# Patient Record
(demographics unavailable — no encounter records)

---

## 2019-01-14 PROCEDURE — 88175 CYTOPATH C/V AUTO FLUID REDO: CPT | Performed by: OBSTETRICS & GYNECOLOGY

## 2024-05-17 NOTE — DISCHARGE INSTRUCTIONS
Suction Curettage (Therapeutic Dilation & Curettage, D&C)   Suction curettage is a procedure to remove the lining and contents of the womb (uterus). It may be done to stop bleeding, control pain, and prevent infection after a miscarriage, , or childbirth. It may also be done to remove a molar pregnancy. This is when tumors grow in the womb instead of or in addition to a fetus.   After the procedure, you should be able to return to your normal routine in 1 or 2 days. But you may have some cramping and light bleeding. This is normal. These problems should go away within 5 to 7 days. You can expect to have your next period within 4 to 6 weeks.   Home care  If you have pain or cramping, use pain medicine as directed.  If you have light bleeding, use pads instead of tampons. Change these as often as needed.  Don't douche, use tampons, or have sex until your healthcare provider says it’s OK.  Take showers instead of baths for 1 to 2 weeks.    Follow-up care  Follow-up with your healthcare provider as directed.  When to seek medical advice  Call your healthcare provider right away if any of these occur:  Fever of 100.4ºF (38ºC) or higher, or as directed by your healthcare provider  Heavy bleeding  Bleeding that lasts longer than 1 week  Pain or cramping worsens instead of getting better  Foul-smelling discharge from the vagina  Passage of anything that resembles tissue from the vagina. If possible, save the tissue and bring it to the healthcare provider.  Weakness, dizziness, or fainting  [employer] last reviewed this educational content on 2022 [employer]. All rights reserved. This information is not intended as a substitute for professional medical care. Always follow your healthcare professional's instructions.

## 2024-05-17 NOTE — ANESTHESIA PREPROCEDURE EVALUATION
PRE-OP EVALUATION    Patient Name: Rhianna Bardales    Admit Diagnosis: MISSED AB    Pre-op Diagnosis: MISSED AB    DILATION AND CURETTAGE SUCTION    Anesthesia Procedure: DILATION AND CURETTAGE SUCTION (Vagina )    Surgeons and Role:     * Chica Kumar MD - Primary    Pre-op vitals reviewed.  Temp: 97.9 °F (36.6 °C)  Pulse: 59  Resp: 16  BP: 121/75  SpO2: 98 %  Body mass index is 23.72 kg/m².    Current medications reviewed.  Hospital Medications:   [Transfer Hold] acetaminophen (Tylenol Extra Strength) tab 1,000 mg  1,000 mg Oral Once    [Transfer Hold] scopolamine (Transderm-Scop) 1 MG/3DAYS patch 1 patch  1 patch Transdermal Once    lactated ringers infusion   Intravenous Continuous    doxycycline hyclate (Vibramycin) 200 mg in sodium chloride 0.9% 250 mL IVPB  200 mg Intravenous Once    [Transfer Hold] Rho D immune globulin (Rhophylac) 1500 Units/2mL injection 300 mcg  300 mcg Intramuscular Once       Outpatient Medications:     Medications Prior to Admission   Medication Sig Dispense Refill Last Dose    Multiple Vitamins-Minerals (MULTIVITAMIN ADULT OR) Take 1 tablet by mouth daily.   5/16/2024       Allergies: Penicillins      Anesthesia Evaluation    Patient summary reviewed.    Anesthetic Complications  (-) history of anesthetic complications         GI/Hepatic/Renal    Negative GI/hepatic/renal ROS.                             Cardiovascular        Exercise tolerance: good                                                Endo/Other    Negative endo/other ROS.                              Pulmonary    Negative pulmonary ROS.                       Neuro/Psych    Negative neuro/psych ROS.                                  Past Surgical History:   Procedure Laterality Date    Patient denies any surgical history       Social History     Socioeconomic History    Marital status: Single   Tobacco Use    Smoking status: Never    Smokeless tobacco: Never   Vaping Use    Vaping status: Never Used   Substance and  Sexual Activity    Alcohol use: Not Currently     Comment: RARE    Drug use: No    Sexual activity: Yes     Partners: Male     Birth control/protection: Implant     History   Drug Use No     Available pre-op labs reviewed.  Lab Results   Component Value Date    WBC 10.7 05/17/2024    RBC 4.53 05/17/2024    HGB 14.3 05/17/2024    HCT 41.9 05/17/2024    MCV 92.5 05/17/2024    MCH 31.6 05/17/2024    MCHC 34.1 05/17/2024    RDW 11.4 05/17/2024    .0 05/17/2024               Airway      Mallampati: II       Cardiovascular    Cardiovascular exam normal.         Dental    Dentition appears grossly intact         Pulmonary    Pulmonary exam normal.                 Other findings              ASA: 2   Plan: MAC  NPO status verified and patient meets guidelines.    Post-procedure pain management plan discussed with surgeon and patient.      Plan/risks discussed with: patient and spouse                Present on Admission:  **None**

## 2024-05-17 NOTE — BRIEF OP NOTE
Pre-Operative Diagnosis: MISSED AB     Post-Operative Diagnosis: MISSED AB      Procedure Performed:   DILATION AND CURETTAGE SUCTION    Surgeons and Role:     * Chica Kumar MD - Primary    Assistant(s):        Surgical Findings: fetal tissue     Specimen: POC - placental tissue retained sterile in saline for chromosome evaluation     Estimated Blood Loss: 50cc    Dictation Number:  [id number]    Chica Kumar MD  5/17/2024  2:59 PM

## 2024-05-17 NOTE — ANESTHESIA POSTPROCEDURE EVALUATION
[facility]    Rhianna Bardales Patient Status:  Hospital Outpatient Surgery   Age/Gender 30 year old female MRN [id number]   Location [facility] PERIOPERATIVE SERVICE Attending Chica Kumar MD   Hosp Day # 0 PCP Arash Knight MD       Anesthesia Post-op Note    DILATION AND CURETTAGE SUCTION    Procedure Summary       Date: 05/17/24 Room / Location:  MAIN OR 15 / [facility] MAIN OR    Anesthesia Start: 1426 Anesthesia Stop: 1512    Procedure: DILATION AND CURETTAGE SUCTION (Vagina ) Diagnosis: (MISSED AB)    Surgeons: Chica Kumar MD Anesthesiologist: Ian Shepherd MD    Anesthesia Type: MAC ASA Status: 2            Anesthesia Type: MAC    Vitals Value Taken Time   /75 05/17/24 1512   Temp 99 05/17/24 1512   Pulse 75 05/17/24 1512   Resp 12 05/17/24 1512   SpO2 98 05/17/24 1512       Patient Location: Endoscopy    Anesthesia Type: MAC    Airway Patency: patent    Postop Pain Control: adequate    Mental Status: mildly sedated but able to meaningfully participate in the post-anesthesia evaluation    Nausea/Vomiting: none    Cardiopulmonary/Hydration status: stable euvolemic    Complications: no apparent anesthesia related complications    Postop vital signs: stable    Dental Exam: Unchanged from Preop    Patient to be discharged from PACU when criteria met.

## 2024-05-17 NOTE — H&P
Rhianna Bardales is a 30 year old  who presents for pre-op for missed AB - has been previously given the options - has had no bleeding since  when the US was done -showing non-viable IUP at 7w1d - no FHT's desires now to proceed with D&C    Menses: Patient's last menstrual period was 2024 (exact date). Cycle length: monthly days Flow: 5 d    PMH - menarche - age 13  No surgeries  No medical issues  Non-smoker    Current Outpatient Medications   Medication Sig Dispense Refill   Multiple Vitamins-Minerals (MULTIVITAMIN ADULT OR) Take 1 tablet by mouth daily.     Past Medical History:   Diagnosis Date   Allergic rhinitis   Hospitalism   none     Past Surgical History:   Procedure Laterality Date   NEXPLANON INJ 2021   Removal and Reinsertion     Family History   Problem Relation Age of Onset   No Known Problems Father   No Known Problems Mother     Social History:   Social History  Tobacco Use  Smoking status: Never  Smokeless tobacco: Never  Vaping Use  Vaping Use: Never used  Alcohol use: Yes  Comment: very rare  Drug use: No      REVIEW OF SYSTEMS:   CONSTITUTIONAL: generally feels well   SKIN: denies any unusual skin lesions, rash, itchiness  HEENT: denies nasal congestion, sinus pain or sore throat; denies blurred vision, visual changes  CARDIOVASCULAR: denies chest pain   LUNGS: denies shortness of breath   GI: denies abdominal pain,denies heartburn  : denies dysuria, vaginal discharge or itching, periods regular   MUSCULOSKELETAL: denies back pain, muscle or joint aches  NEUROLOGIC: denies headaches  PSYCHIATRIC: denies depression or anxiety, mood is good  HEMATOLOGIC: denies history of anemia  ENDOCRINE: denies thyroid history, cold/heat intolerance  ALLERGIC: denies allergy symptoms    EXAM:     VITALS: /78  Pulse 67  Ht 5' 6.5\" (1.689 m)  Wt 149 lb (67.6 kg)  LMP 2024 (Exact Date)  BMI 23.69 kg/m²   GENERAL: Well-appearing, no apparent distress, well nourished, well  developed  SKIN: Normal, no rashes, no acne, no hirsutism, no ulcers  HEENT: Atraumatic, normocephalic, throat is clear  NECK: No masses, symmetric  THYROID: No masses, no thyromegaly  BREASTS: Normal inspection, no dominant masses on palpation, no lymphadenopathy  CV: Regular rate and rhythm  LUNGS: Clear to auscultation bilaterally, good air entry throughout  ABD: Soft, nontender and not distended, no masses, no hernia  EXTREMITIES: No edema    PSYCHIATRIC: Patient oriented to time, place and person; mood and affect appropriate    ASSESSMENT AND PLAN:   Rhianna Bardales is a 30 year old female who presents for pre-op for Suction D&C - procedure explained - risk of bleeding/infection/anesthesia complication/retained tissue/uterine perforation - pt understands and desires to proceed - will send tissue for chromosomes  No additional questions or change in H&P

## 2024-05-18 NOTE — OPERATIVE REPORT
[facility]    PATIENT'S NAME: JOSÉ MIGUEL RODRIGUEZ   ATTENDING PHYSICIAN: Chica Kumar M.D.   OPERATING PHYSICIAN: Chica Kumar M.D.   PATIENT ACCOUNT#:   [id number]    LOCATION:  [facility] 2 EDW 10  MEDICAL RECORD #:   [id number]       YOB: 1994  ADMISSION DATE:       2024      OPERATION DATE:  2024    OPERATIVE REPORT      PREOPERATIVE DIAGNOSIS:  Missed .  POSTOPERATIVE DIAGNOSIS:  Missed .  PROCEDURE:  Suction dilatation and curettage.    ANESTHESIA:  MAC.    ESTIMATED BLOOD LOSS:  50 mL.    SPECIMEN:  Products of conception and placental tissue retained sterilely for chromosome evaluation.    COUNTS:  All sponge, needle, and instrument counts were correct.     COMPLICATIONS:  None.    DISPOSITION:  The patient was transferred to the recovery room in stable condition.    OPERATIVE TECHNIQUE:  The patient was taken to the operating room after IV sedation was given and then an LMA was placed.  At this point, the perineum and vagina were prepped and draped in usual sterile manner.  A weighted speculum was placed in the vagina.  The anterior lip of the cervix was grasped with a single-tooth tenaculum.  The cervix was progressively dilated to accommodate a #8 curved suction curette.  Suction was performed.  A large piece of placental tissue was retained sterilely in saline for chromosome evaluation.  Repeat suction was performed until there was no obvious residual fetal tissue.  Gentle sharp curettage was performed until there was good cry in all 4 quadrants of the uterus.  Repeat suction yielded no residual tissue.    Dictated By Chica Kumar M.D.  d: 2024 15:01:50  t: 2024 21:53:51  [facility] [id number]  [facility]/

## 2025-04-02 NOTE — PROGRESS NOTES
CARDIOLOGY CONSULTATION AND FETAL ECHOCARDIOGRAM :    Dear Dr. Gupta,     Thank you for requesting FETAL ECHOCARDIOGRAM AND  CARDIOLOGY  consultation on your patient Rhianna Bardales.  As you are aware she is a 31 year old female with a Francis pregnancy at 33w0d.      A  cardiology consultation was requested secondary to fetal cardiac arrhythmia .    Her prenatal records and labs were reviewed.     There were irregular beats last week at her regular PNV.  Good FM.  No concerns.  No caffeine, no meds other than PNV, no smoking or alcohol use.     HISTORY  OB History     T0    L0    SAB1  IAB0  Ectopic0  Multiple0  Live Births0   # 1 - Date: 2024, Sex: None, Weight: None, GA: None, Type: None, Apgar1: None, Apgar5: None, Living: None, Birth Comments: None     # 2 - Date: None, Sex: None, Weight: None, GA: None, Type: None, Apgar1: None, Apgar5: None, Living: None, Birth Comments: None     Past Medical History  The patient  has a past medical history of Allergic rhinitis and Hospitalism.     Past Surgical History  The patient  has a past surgical history that includes nexplanon inj (2021) and d & c (2024).     Family History  The patient She indicated that her mother is alive. She indicated that her father is alive.        Medications:   Current Outpatient Medications:     Prenatal Vit-Fe Sulfate-FA-DHA (PRENATAL+DHA OR), Take by mouth., Disp: , Rfl:   Allergies:   Penicillins             OTHER (SEE COMMENTS), UNKNOWN    Comment:Reaction unknown. Had it when little             CHILDHOOD ALLERGY, UNKNOWN REACTION. UNSURE IF             HOSPITALIZED.    FETAL ECHOCARDIOGRAM :  (Fetal heart measurements are under the imaging tab)    Situs: situs solitus (normal). Cardiac position: levocardia (normal). Cardiac axis: normal. Cardiac size: normal (approx. 1/3 of thoracic area). Cardiac rhythm: abnormal, Frequent premature atrial contractions with occasions of trigeminy  rhythm. Cardiac function: good contractility (normal). 4-chamber view: normal. LVOT view: normal. RVOT view: normal. 3-vessel view: normal. 3-vessel-trachea view: normal. Long axis view: normal. Aortic arch view: normal. Ductal arch view: normal. Bicaval view: normal    AV connections: Normal connections. VA connections: Normal connections. IVC: Normal entrance into the right atrium. SVC: Normal entrance into the right atrium. Pulmonary veins: Two pulmonary veins entry into the left atrium. Right atrium: Normal in size. Left atrium: Normal in size. Atrial septum: aneurysmal flap valve. Foramen ovale: normal flow: right to left from the inferior portions of the aneurysmal atrial septum. Right ventricle: Normal sized RV with normal systolic functions. Left ventricle: Normal sized LV with normal systolic functions. Ventricular septum: Visualized portions of the ventricular are intact  Tricuspid valve: normal size and morphology. Mitral valve: normal size and morphology. Pulmonary valve: normal size . Aortic valve: normal size. Cross-over gr. arteries: anterior great artery (confirmed to be the pulmonary artery by its branching) which crosses the course of the proximal aorta, indicative of normal relationship of the great arteries. Main PA: the main pulmonary artery can be seen bifurcating into the ductus arteriosus and the right pulmonary artery. Ascending aorta: normal size and morphology. Ductal arch: Left ductus arteriosus. Aortic arch: Single left sided aortic arch with no evidence for coarctation. Ductus venosus: normal. Umbilical vein: normal. Umbilical arteries: normal  Echogenic focus: no. Pericardial effusion: no  Color Doppler (Qualitatively):   IVC inflow into RA: normal. SVC inflow into RA: normal. Pulm. veins inflow into LA: normal. Flow through foramen ovale: right-left shunt (normal). Tricuspid valve flow: normal. Mitral valve flow: normal. Ventricular septum: normal. RVOT / Pulmonary valve flow: normal.  LVOT / Aortic valve flow: normal. Flow in pulmonary arteries: normal. Flow in ductus arteriosus: normal. Flow in aortic arch: normal. Flow in ductus venosus: normal. Flow in the umbilical vein: normal. Flow in the umbilical arteries: normal      Fetal echocardiogram findings are described above.   There were frequent premature atrial contractions with occasions of trigeminy rhythm. 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 Normal right to left shunting from the inferior portions of the aneurysmal atrial septum. Otherwise unremarkable fetal echocardiogram. No major structural or functional abnormality was noted.    I had a lengthy discussion regarding my findings with the mother.  I drew a picture of the fetal heart and went over my findings with her. I answered her questions. She seemed to have a good understanding the issues we talked.     DISCUSSION:      Cardiac arrhythmias result from abnormal automaticity, abnormal conduction, or both. Fetal cardiac arrhythmias complicate 1 to 2 percent of pregnancies. They are categorized according to their rhythm (irregular, regular) and rate (tachycardia, bradycardia). The conduction system of the fetal heart is functionally mature by 16 weeks of gestation, and produces a regular rhythm and rate between 110 and 160 beats per minute (bpm) for the remainder of the pregnancy.    The most common cause of an irregular rhythm in the fetus is premature atrial contractions. In some cases, there is 1:1 conduction of ectopic beats so the ventricular contraction will also be early. On auscultation, the rhythm will sound irregular, but the rate will be nearly normal.     In other cases, the premature beat will be blocked so there will be no associated ventricular contraction. In these cases, the ventricular rate will be slower than the atrial rate. When the premature beats are blocked intermittently, the rhythm sounds irregular and the heart rate rapidly varies from normal to slow.      Premature atrial contractions are usually benign and intermittent, and may resolve prior to delivery or shortly after birth. However, 1 to 3 percent of fetuses with premature atrial contractions will develop a tachyarrhythmia, which can lead to cardiovascular decompensation. Any fetus with a tachyarrhythmia should be evaluated promptly by fetal cardiology. Congenital heart disease is identified in only 0.3 to 2 percent of fetuses with premature atrial beats    Small VSDs,minor valve problems,coronary artery anomalies,PAPVR and coarctation of the aorta cannot be excluded by fetal echocardiograms.        IMPRESSION :    31 years old G1 L0 IUP at 33w 0d  Fetal cardiac arrhythmia  Normal fetal growth and  testing  Negative family history for cardiac arrhythmia, congenital heart disease,and  heart surgeries  Fetal echocardiogram : Frequent premature atrial contractions with occasions of trigeminy rhythm. 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 Normal right to left shunting from the inferior portions of the aneurysmal atrial septum. Otherwise unremarkable fetal echocardiogram. No major structural or functional abnormality was noted.    RECOMMENDATIONS :    Routine Ob and MFM care  Follow up fetal echocardiogram in a week  Avoid medications which may induce cardiac arrhythmia, and avoid excessive caffeine intake    Thank you for allowing me to participate in the care of this patient.  Please feel free to contact me with any questions.     Rona Olvera MD  Fetal/Pediatric Cardiology     Total time spent 60 minutes this calendar day which includes preparing to see the patient including chart review, obtaining and/or reviewing additional medical history, documenting clinical information in the electronic medical record, independently interpreting results, counseling the patient, communicating results to the patient/family/caregiver and coordinating care.     Note to patient and family:  The 21st Century Cures Act  makes medical notes available to patients in the interest of transparency.  However, please be advised that this is a medical document.  It is intended as a peer to peer communication.  It is written in medical language and may contain abbreviations or verbiage that are technical and unfamiliar.  It may appear blunt or direct.  Medical documents are intended to carry relevant information, facts as evident, and the clinical opinion of the practitioner.

## 2025-04-08 NOTE — PROGRESS NOTES
FOLLOW UP FETAL ECHOCARDIOGRAM AND  CARDIOLOGY CONSULTATION     Dear Dr. Gupta,     Thank you for requesting a follow up fetal echocardiogram and a follow up  cardiology consultation on your patient Rhianna Bardales.  As you are aware she is a 31 year old female with a Francis pregnancy at 33w0d.       An initial   cardiology consultation was requested secondary to fetal cardiac arrhythmia .    Fetal echocardiogram obtained on 2025 showed frequent premature atrial contractions with occasions of trigeminy rhythm. 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 Normal right to left shunting from the inferior portions of the aneurysmal atrial septum. Otherwise unremarkable fetal echocardiogram. No major structural or functional abnormality was noted.    Her prenatal records and labs were reviewed.     There were irregular beats last week at her regular PNV.  Good FM.  No concerns.  No caffeine, no meds other than PNV, no smoking or alcohol use.     HISTORY  OB History     T0    L0    SAB1  IAB0  Ectopic0  Multiple0  Live Births0   # 1 - Date: 2024, Sex: None, Weight: None, GA: None, Type: None, Apgar1: None, Apgar5: None, Living: None, Birth Comments: None     # 2 - Date: None, Sex: None, Weight: None, GA: None, Type: None, Apgar1: None, Apgar5: None, Living: None, Birth Comments: None     Past Medical History  The patient  has a past medical history of Allergic rhinitis and Hospitalism.     Past Surgical History  The patient  has a past surgical history that includes nexplanon inj (2021) and d & c (2024).     Family History  The patient She indicated that her mother is alive. She indicated that her father is alive.        Medications:   Current Outpatient Medications:     Prenatal Vit-Fe Sulfate-FA-DHA (PRENATAL+DHA OR), Take by mouth., Disp: , Rfl:   Allergies:   Penicillins             OTHER (SEE COMMENTS), UNKNOWN    Comment:Reaction unknown. Had it when little              CHILDHOOD ALLERGY, UNKNOWN REACTION. UNSURE IF             HOSPITALIZED.      FETAL ECHOCARDIOGRAM :  (Fetal heart measurements are under the imaging tab)     Situs: situs solitus (normal). Cardiac position: levocardia (normal). Cardiac axis: normal. Cardiac size: normal (approx. 1/3 of thoracic area). Cardiac rhythm: abnormal, Frequent isolated premature atrial contractions with occasions of trigeminy rhythm. Cardiac function: good contractility (normal). 4-chamber view: normal. LVOT view: normal. RVOT view: normal. 3-vessel view: normal. 3-vessel-trachea view: normal. Long axis view: normal. Aortic arch view: normal. Ductal arch view: normal. Bicaval view: normal    AV connections: Normal connections. VA connections: Normal connections. IVC: Normal entrance into the right atrium. SVC: Normal entrance into the right atrium. Pulmonary veins: Two pulmonary veins entry into the left atrium. Right atrium: Normal in size. Left atrium: Normal in size. 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 Foramen ovale: normal flow: right to left. Right ventricle: Normal sized RV with normal systolic functions. Left ventricle: Normal sized LV with normal systolic functions. Ventricular septum: Visualized portions of the ventricular are intact  Tricuspid valve: normal size and morphology. Mitral valve: normal size and morphology. Pulmonary valve: normal size . Aortic valve: normal size. Cross-over gr. arteries: anterior great artery (confirmed to be the pulmonary artery by its branching) which crosses the course of the proximal aorta, indicative of normal relationship of the great arteries. Main PA: the main pulmonary artery can be seen bifurcating into the ductus arteriosus and the right pulmonary artery. Pulmonary arteries: Visualized. Ascending aorta: normal size and morphology. Ductal arch: Left ductus arteriosus. Aortic arch: Single left sided aortic arch with no evidence for coarctation. Ductus venosus:  normal. Umbilical vein: normal. Umbilical arteries: normal  Echogenic focus: no. Pericardial effusion: no  Color Doppler (Qualitatively):   IVC inflow into RA: normal. SVC inflow into RA: normal. Pulm. veins inflow into LA: normal. Flow through foramen ovale: right-left shunt (normal). Tricuspid valve flow: normal. Mitral valve flow: normal. Ventricular septum: normal. RVOT / Pulmonary valve flow: normal. LVOT / Aortic valve flow: normal. Flow in pulmonary arteries: normal. Flow in ductus arteriosus: normal. Flow in aortic arch: normal. Flow in ductus venosus: normal. Flow in the umbilical vein: normal. Flow in the umbilical arteries: normal      Fetal echocardiogram findings are described above.   Technically limited and suboptimal study.  Similar findings . No significant change comparing to the previous study.   Frequent isolated premature atrial contractions with occasions of trigeminy rhythm. 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 Otherwise unremarkable fetal echocardiogram. No other major structural or functional abnormality was noted.     I had a lengthy discussion regarding my findings with the mother.  I went over my findings with her. We talked about the risk of fetal supraventricular tachycardia. She had many questions.  I answered them all.  She seemed to have a good understanding the issues we talked.     DISCUSSION:      Cardiac arrhythmias result from abnormal automaticity, abnormal conduction, or both. Fetal cardiac arrhythmias complicate 1 to 2 percent of pregnancies. They are categorized according to their rhythm (irregular, regular) and rate (tachycardia, bradycardia). The conduction system of the fetal heart is functionally mature by 16 weeks of gestation, and produces a regular rhythm and rate between 110 and 160 beats per minute (bpm) for the remainder of the pregnancy.     The most common cause of an irregular rhythm in the fetus is premature atrial contractions. In some cases, there  is 1:1 conduction of ectopic beats so the ventricular contraction will also be early. On auscultation, the rhythm will sound irregular, but the rate will be nearly normal.     In other cases, the premature beat will be blocked so there will be no associated ventricular contraction. In these cases, the ventricular rate will be slower than the atrial rate. When the premature beats are blocked intermittently, the rhythm sounds irregular and the heart rate rapidly varies from normal to slow.     Premature atrial contractions are usually benign and intermittent, and may resolve prior to delivery or shortly after birth. However, 1 to 3 percent of fetuses with premature atrial contractions will develop a tachyarrhythmia, which can lead to cardiovascular decompensation. Any fetus with a tachyarrhythmia should be evaluated promptly by fetal cardiology. Congenital heart disease is identified in only 0.3 to 2 percent of fetuses with premature atrial beats       IMPRESSION :    31 years old G1 L0 IUP at 33w 6d  Fetal cardiac arrhythmia  Normal fetal growth and  testing  Negative family history for cardiac arrhythmia, congenital heart disease,and  heart surgeries  2025 Fetal echocardiogram : Frequent premature atrial contractions with occasions of trigeminy rhythm. 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 Normal right to left shunting from the inferior portions of the aneurysmal atrial septum. Otherwise unremarkable fetal echocardiogram. No major structural or functional abnormality was noted.  2025 : Follow up fetal echocardiogram: Technically limited and suboptimal study.  Similar findings . No significant change comparing to the previous study.   Frequent isolated premature atrial contractions with occasions of trigeminy rhythm. 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 Otherwise unremarkable fetal echocardiogram. No other major structural or functional abnormality was noted.    RECOMMENDATIONS :    Routine  Ob and MFM care  Weekly fetal heart rate checks  Avoid medications which may induce cardiac arrhythmia, and avoid excessive caffeine intake   EKG     Small VSDs,minor valve problems,coronary artery anomalies,PAPVR and coarctation of the aorta cannot be excluded by fetal echocardiograms.      Thank you for allowing me to participate in the care of this patient.  Please feel free to contact me with any questions.     Rona lOvera MD  Fetal/Pediatric Cardiology     Total time spent 60 minutes this calendar day which includes preparing to see the patient including chart review, obtaining and/or reviewing additional medical history, documenting clinical information in the electronic medical record, independently interpreting results, counseling the patient, communicating results to the patient/family/caregiver and coordinating care.     Note to patient and family:  The 21st Century Cures Act makes medical notes available to patients in the interest of transparency.  However, please be advised that this is a medical document.  It is intended as a peer to peer communication.  It is written in medical language and may contain abbreviations or verbiage that are technical and unfamiliar.  It may appear blunt or direct.  Medical documents are intended to carry relevant information, facts as evident, and the clinical opinion of the practitioner.

## 2025-04-11 NOTE — PROGRESS NOTES
Pt is a 31 year old female admitted to TRG2/TRG[facility]-A.     Chief Complaint   Patient presents with    Decreased Fetal Movement     Pt states she is feeling baby but is not feeling the usual movements.       Pt is  34w2d intra-uterine pregnancy.  History obtained, consents signed. Oriented to room, staff, and plan of care.

## 2025-04-11 NOTE — NST
Nonstress Test   Patient: Rhianna Bardales    Gestation: 34w2d    NST:       Variability: Moderate           Accelerations: Yes           Decelerations: None            Baseline: 135 BPM           Uterine Irritability: Yes           Contractions: Irregular                                                    Nonstress Test Interpretation: Reactive           Nonstress Test Second Interpretation: Reactive                     Additional Comments

## 2025-05-20 NOTE — H&P
Pt is 30 y/o  at 39w6d - presented for IOL for known fetal arrhythmia - s/p cytotec - now on pitocin    PMH - Known fetal arrhythmia - has been seeing / Bee - last fetal echo  - bigeminy  or frequent PACs              last EFW - 65%              Nl 1 hour              GBS +  /57   Pulse 60   Temp 97.9 °F (36.6 °C) (Oral)   Resp 18   Wt 180 lb (81.6 kg)   LMP 2024   SpO2 96%   BMI 28.62 kg/m²   Lungs clear  FHT's - normal baseline - moderate variability - + accels - arrhythmia appreciated  Ctx's about q 2 1/2 minutes  VE 4/80/-1  AROm - clear  A/P Term IUP- - Anticipate

## 2025-05-20 NOTE — PROGRESS NOTES
Spoke with pharmacy regarding patients unknown reaction to pnc as a child, okay to proceed with ampicillin for GBS.

## 2025-05-20 NOTE — ANESTHESIA PREPROCEDURE EVALUATION
PRE-OP EVALUATION    Patient Name: Rhianna Bardales    Admit Diagnosis: Pregnancy (HCC) [Z34.90]    Pre-op Diagnosis: * No pre-op diagnosis entered *        Anesthesia Procedure: LABOR ANALGESIA    * No surgeons found in log *    Pre-op vitals reviewed.  Temp: 97.9 °F (36.6 °C)  Pulse: 60  Resp: 18  BP: 115/62  SpO2: 96 %  Body mass index is 28.62 kg/m².    Current medications reviewed.  Hospital Medications:  Current Medications[1]    Outpatient Medications:   Prescriptions Prior to Admission[2]    Allergies: Penicillins      Anesthesia Evaluation        Anesthetic Complications           GI/Hepatic/Renal      (-) GERD       (-) chronic renal disease   (-) liver disease                 Cardiovascular        Exercise tolerance: good     MET: >4      (-) hypertension     (-) CAD  (-) past MI  (-) CABG/stent  (-) pacemaker/AICD  (-) valvular problems/murmurs     (-) dysrhythmias   (-) CHF  (-) angina     (-) BOTELLO         Endo/Other      (-) diabetes                            Pulmonary      (-) asthma  (-) COPD                   Neuro/Psych          (-) CVA     (-) seizures                       Past Surgical History[3]  Social Hx on file[4]  History   Drug Use No     Available pre-op labs reviewed.  Lab Results   Component Value Date    WBC 8.1 05/20/2025    RBC 4.12 05/20/2025    HGB 12.7 05/20/2025    HCT 38.0 05/20/2025    MCV 92.2 05/20/2025    MCH 30.8 05/20/2025    MCHC 33.4 05/20/2025    RDW 13.0 05/20/2025    .0 05/20/2025               Airway      Mallampati: II  Mouth opening: 3 FB  TM distance: 4 - 6 cm  Neck ROM: full Cardiovascular    Cardiovascular exam normal.         Dental             Pulmonary    Pulmonary exam normal.                 Other findings              ASA: 2   Plan: epidural  NPO status verified and           Plan/risks discussed with: patient                Present on Admission:  **None**             [1]    lactated ringers infusion   Intravenous Continuous    dextrose in  lactated ringers 5% infusion   Intravenous PRN    lactated ringers IV bolus 500 mL  500 mL Intravenous PRN    acetaminophen (Tylenol Extra Strength) tab 500 mg  500 mg Oral Q6H PRN    acetaminophen (Tylenol Extra Strength) tab 1,000 mg  1,000 mg Oral Q6H PRN    ibuprofen (Motrin) tab 600 mg  600 mg Oral Once PRN    ondansetron (Zofran) 4 MG/2ML injection 4 mg  4 mg Intravenous Q6H PRN    oxyTOCIN in sodium chloride 0.9% (Pitocin) 30 Units/500mL infusion premix  62.5-900 nolvia-units/min Intravenous Continuous    terbutaline (Brethine) 1 MG/ML injection 0.25 mg  0.25 mg Subcutaneous PRN    sodium citrate-citric acid (Bicitra) 500-334 MG/5ML oral solution 30 mL  30 mL Oral PRN    ropivacaine (Naropin) 0.5% injection  30 mL Injection PRN    [COMPLETED] ampicillin (Omnipen) 2 g in sodium chloride 0.9% 100 mL IVPB-SYMONE  2 g Intravenous Once    Followed by    ampicillin (Omnipen) 1 g in sodium chloride 0.9% 100mL IVPB-SYMONE  1 g Intravenous Q4H    misoprostol (CYTOTEC) partial tablets 25 mcg  25 mcg Vaginal 6 times per day    lactated ringers IV bolus 1,000 mL  1,000 mL Intravenous Once    fentaNYL-bupivacaine 2 mcg/mL-0.125% in sodium chloride 0.9% 100 mL EPIDURAL infusion premix  12 mL/hr Epidural Continuous    fentaNYL (Sublimaze) 50 mcg/mL injection 100 mcg  100 mcg Epidural Once    lidocaine 1.5%-EPINEPHrine 1:200,000 (Xylocaine-Epinephrine) injection  5 mL Injection PRN    bupivacaine PF (Marcaine) 0.25% injection  30 mL Injection PRN    lidocaine PF (Xylocaine-MPF) 2% injection  5 mL Injection PRN    sodium chloride 0.9% PF injection 10 mL  10 mL Injection PRN    ePHEDrine (PF) 25 MG/5 ML injection 5 mg  5 mg Intravenous PRN    nalbuphine (Nubain) 10 mg/mL injection 2.5 mg  2.5 mg Intravenous Q15 Min PRN   [2]   Medications Prior to Admission   Medication Sig Dispense Refill Last Dose/Taking    Multiple Vitamins-Minerals (MULTIVITAMIN ADULT OR) Take 1 tablet by mouth in the morning.   5/19/2025    ibuprofen 600 MG Oral  Tab Take 1 tablet (600 mg total) by mouth every 8 (eight) hours as needed for Pain. 15 tablet 0    [3]   Past Surgical History:  Procedure Laterality Date    D&c after delivery      Patient denies any surgical history     [4]   Social History  Socioeconomic History    Marital status:    Tobacco Use    Smoking status: Never    Smokeless tobacco: Never   Vaping Use    Vaping status: Never Used   Substance and Sexual Activity    Alcohol use: Not Currently     Comment: RARE    Drug use: No    Sexual activity: Yes     Partners: Male     Birth control/protection: Implant

## 2025-05-20 NOTE — ANESTHESIA PROCEDURE NOTES
Labor Analgesia    Date/Time: 5/20/2025 5:37 PM    Performed by: Angelito Terrell MD  Authorized by: Angelito Terrell MD      General Information and Staff    Start Time:  5/20/2025 5:37 PM  End Time:  5/20/2025 5:49 PM  Anesthesiologist:  Angelito Terrell MD  Performed by:  Anesthesiologist  Patient Location:  OB  Site Identification: surface landmarks    Reason for Block: labor epidural    Preanesthetic Checklist: patient identified, IV checked, risks and benefits discussed, monitors and equipment checked, pre-op evaluation, timeout performed, IV bolus, anesthesia consent and sterile technique used      Procedure Details    Patient Position:  Sitting  Prep: ChloraPrep    Monitoring:  Heart rate and continuous pulse ox  Approach:  Midline    Epidural Needle    Injection Technique:  ALEX saline  Needle Type:  Tuohy  Needle Gauge:  17 G  Needle Length:  3.375 in  Needle Insertion Depth:  5  Location:  L3-4    Spinal Needle      Catheter    Catheter Type:  End hole  Catheter Size:  19 G  Catheter at Skin Depth:  9  Test Dose:  Negative    Assessment    Sensory Level:  T10    Additional Comments

## 2025-05-21 NOTE — PLAN OF CARE
Problem: BIRTH - VAGINAL/ SECTION  Goal: Fetal and maternal status remain reassuring during the birth process  Description: INTERVENTIONS:  - Monitor vital signs  - Monitor fetal heart rate  - Monitor uterine activity  - Monitor labor progression (vaginal delivery)  - DVT prophylaxis (C/S delivery)  - Surgical antibiotic prophylaxis (C/S delivery)  Outcome: Not Progressing

## 2025-05-21 NOTE — PROGRESS NOTES
OB Progress Note PPD#1  S: Feels well. Ambulating, eating. Pain controlled. Minimal VB.  Breastfeeding.    O:   Blood pressure 117/59, pulse 61, temperature 98.3 °F (36.8 °C), temperature source Oral, resp. rate 16, height 5' 7\" (1.702 m), weight 180 lb (81.6 kg), last menstrual period 2024, SpO2 96%, currently breastfeeding.  Gen: NAD, AAOx3  Exam per RN  Breasts: soft, nontender, nonerythematous  Abdomen: soft, nontender, nondistended, fundus firm and nontender  Gyne: moderate lochia  Ext: trace edema      Lab Results  Lab Results   Component Value Date    WBC 16.6 2025    HGB 11.0 2025    HCT 32.1 2025    .0 2025     Recent Labs   Lab 25  0840 25  0655   RBC 4.12 3.49*   HGB 12.7 11.0*   HCT 38.0 32.1*   MCV 92.2 92.0   MCH 30.8 31.5   MCHC 33.4 34.3   RDW 13.0 13.0   NEPRELIM 5.97 14.41*   WBC 8.1 16.6*   .0 203.0           A/P: PPD#1 s/p , doing well  1) Pain: motrin PRN; advised to ask for pain medication regularly. Counseled on use of dermoplast and ice for perineum  2) Breastfeeding: given encouragement and support; lactation consult PRN  3) CV/resp: hemodynamically stable  4) GI: general diet  5) /gyne: voiding well; normal lochia  6) Heme: asymptomatic anemia  7) DVT ppx: ambulating  8) Mood: happy  9) Fluids/elec: IV out  10) Other: circumcision declined  Anticipate discharge tomorrow    Inés Gupta (previously Trevor) MD [facility]  Contact via [employer]

## 2025-05-21 NOTE — PROGRESS NOTES
Pt transferred to Mother Baby room 2194 in stable condition. Report given to Carly KWON. Infant transferred with mother in stable condition.

## 2025-05-21 NOTE — PROGRESS NOTES
Labor Analgesia Follow Up Note    Patient underwent epidural anesthesia for labor analgesia,    Placenta Date/Time: 5/20/2025 10:56 PM    Delivery Date/Time:: 5/20/2025  10:53 PM    /65 (BP Location: Left arm)   Pulse 73   Temp 97.5 °F (36.4 °C) (Oral)   Resp 18   Ht 1.702 m (5' 7\")   Wt 81.6 kg (180 lb)   LMP 08/14/2024   SpO2 96%   Breastfeeding Yes   BMI 28.19 kg/m²     Assessment:  Patient seen and no apparent anesthesia related complications.    Thank you for asking us to participate in the care of your patient.

## 2025-05-21 NOTE — PROGRESS NOTES
Pt relatively comfortable - sometimes can tell when a ctx starts  /73   Pulse 67   Temp 97.9 °F (36.6 °C) (Oral)   Resp 18   Wt 180 lb (81.6 kg)   LMP 08/14/2024   SpO2 96%   BMI 28.62 kg/m²   FHT - baseline 135 to 140 - moderate variability - + accels - rare minor variables  VE C/0 station  Pt pushed well with 1 ctx -   A/P Baby stable - good progress - allow to push

## 2025-05-21 NOTE — L&D DELIVERY NOTE
Vu Bardales [[id number]]      Labor Events     labor?: No   steroids?: None  Antibiotics received during labor?: Yes  Rupture date/time: 2025 1808     Rupture type: AROM  Fluid color: Clear  Labor type: Induced Onset of Labor  Induction: Misoprostol  Indications for induction: Fetal Heart Rate or Rhythm Abnormality  Intrapartum & labor complications: Fetal arrythmia, Variable decelerations, Late decelerations       Labor Event Times    Labor onset date/time: 2025 1326  Dilation complete date/time: 2025  Start pushing date/time: 2025 21:35       [place] Presentation    Presentation: Vertex  Position: Left Occiput Anterior       Operative Delivery    Operative Vaginal Delivery: No                Shoulder Dystocia    Shoulder Dystocia: No       Anesthesia    Method: Epidural              [place] Delivery      Head delivery date/time: 2025 22:53:11   Delivery date/time:  25 22:53:33   Delivery type: Normal spontaneous vaginal delivery    Details:     Delivery location: delivery room       Delivery Providers    Delivering Clinician: Chica Kumar MD   Delivery personnel:  Provider Role   Bhavana Webster, RN Baby Nurse   Yumiko Shultz RN Delivery Nurse             Cord    Vessels: 3 Vessels  Complications: Nuchal  # of loops: 1  Timed cord clamping: Yes  Time in sec: 30  Cord blood disposition: to lab  Gases sent?: No       Resuscitation    Method: None       [place] Measurements      Weight: 4150 g 9 lb 2.4 oz Length: 54.6 cm               Placenta    Date/time: 2025 22:56  Removal: Spontaneous  Appearance: Intact  Disposition: held for future pathology       Apgars    Living status: Living   Apgar Scoring Key:    0 1 2    Skin color Blue or pale Acrocyanotic Completely pink    Heart rate Absent <100 bpm >100 bpm    Reflex irritability No response Grimace Cry or active withdrawal    Muscle tone Limp Some flexion Active motion    Respiratory effort Absent  Weak cry; hypoventilation Good, crying              1 Minute:  5 Minute:  10 Minute:  15 Minute:  20 Minute:      Skin color:        Heart rate:        Reflex irritablity:        Muscle tone:        Respiratory effort:        Total:            disposition: with mother       Skin to Skin    No data filed       Vaginal Count    Initial count RN: Yumiko Shultz RN  Initial count Tech: Yevgeniy Fridageovanni Urena    Initial counts 11   0    Final counts 11   2    Final count RN: Yumiko Shultz RN  Final count MD: Chica Kumar MD       Lacerations    Episiotomy: Median  Indications for episiotomy: Maternal Exhaustion  Perineal lacerations: None      Vaginal laceration?: No      Cervical laceration?: No    Clitoral laceration?: No    Estimated blood loss (mL): 75            Pt was C and  - no progress after 5 ctx's - so MLE cut - head delivered spontaneously - nares and mouth suctioned on the perineum.Nuchal cord X 1- reducible - rest of the infant delivered easily. Vigorous liveborn male - placed on the mother's abdomen. After delayed cord clamping - placenta delivered spontaneously intact.    2nd degree MLE - repaired with 3.0 vicryl    Findings - liveborn male - Apg 8 and 9 - 9#2  Pt tolerated procedure well - all counts correct

## 2025-05-22 NOTE — PLAN OF CARE
Problem: BIRTH - VAGINAL/ SECTION  Goal: Fetal and maternal status remain reassuring during the birth process  Description: INTERVENTIONS:  - Monitor vital signs  - Monitor fetal heart rate  - Monitor uterine activity  - Monitor labor progression (vaginal delivery)  - DVT prophylaxis (C/S delivery)  - Surgical antibiotic prophylaxis (C/S delivery)  Outcome: Completed     Problem: PAIN - ADULT  Goal: Verbalizes/displays adequate comfort level or patient's stated pain goal  Description: INTERVENTIONS:  - Encourage pt to monitor pain and request assistance  - Assess pain using appropriate pain scale  - Administer analgesics based on type and severity of pain and evaluate response  - Implement non-pharmacological measures as appropriate and evaluate response  - Consider cultural and social influences on pain and pain management  - Manage/alleviate anxiety  - Utilize distraction and/or relaxation techniques  - Monitor for opioid side effects  - Notify MD/LIP if interventions unsuccessful or patient reports new pain  - Anticipate increased pain with activity and pre-medicate as appropriate  Outcome: Completed     Problem: ANXIETY  Goal: Will report anxiety at manageable levels  Description: INTERVENTIONS:  - Administer medication as ordered  - Teach and rehearse alternative coping skills  - Provide emotional support with 1:1 interaction with staff  Outcome: Completed     Problem: POSTPARTUM  Goal: Long Term Goal:Experiences normal postpartum course  Description: INTERVENTIONS:  - Assess and monitor vital signs and lab values.  - Assess fundus and lochia.  - Provide ice/sitz baths for perineum discomfort.  - Monitor healing of incision/episiotomy/laceration, and assess for signs and symptoms of infection and hematoma.  - Assess bladder function and monitor for bladder distention.  - Provide/instruct/assist with pericare as needed.  - Provide VTE prophylaxis as needed.  - Monitor bowel function.  - Encourage  ambulation and provide assistance as needed.  - Assess and monitor emotional status and provide social service/psych resources as needed.  - Utilize standard precautions and use personal protective equipment as indicated. Ensure aseptic care of all intravenous lines and invasive tubes/drains.  - Obtain immunization and exposure to communicable diseases history.  Outcome: Completed  Goal: Optimize infant feeding at the breast  Description: INTERVENTIONS:  - Initiate breast feeding within first hour after birth.   - Monitor effectiveness of current breast feeding efforts.  - Assess support systems available to mother/family.  - Identify cultural beliefs/practices regarding lactation, letdown techniques, maternal food preferences.  - Assess mother's knowledge and previous experience with breast feeding.  - Provide information as needed about early infant feeding cues (e.g., rooting, lip smacking, sucking fingers/hand) versus late cue of crying.  - Discuss/demonstrate breast feeding aids (e.g., infant sling, nursing footstool/pillows, and breast pumps).  - Encourage mother/other family members to express feelings/concerns, and actively listen.  - Educate father/SO about benefits of breast feeding and how to manage common lactation challenges.  - Recommend avoidance of specific medications or substances incompatible with breast feeding.  - Assess and monitor for signs of nipple pain/trauma.  - Instruct and provide assistance with proper latch.  - Review techniques for milk expression (breast pumping) and storage of breast milk. Provide pumping equipment/supplies, instructions and assistance, as needed.  - Encourage rooming-in and breast feeding on demand.  - Encourage skin-to-skin contact.  - Provide LC support as needed.  - Assess for and manage engorgement.  - Provide breast feeding education handouts and information on community breast feeding support.   Outcome: Completed  Goal: Establishment of adequate milk supply  with medication/procedure interruptions  Description: INTERVENTIONS:  - Review techniques for milk expression (breast pumping).   - Provide pumping equipment/supplies, instructions, and assistance until it is safe to breastfeed infant.  Outcome: Completed  Goal: Experiences normal breast weaning course  Description: INTERVENTIONS:  - Assess for and manage engorgement.  - Instruct on breast care.  - Provide comfort measures.  Outcome: Completed  Goal: Appropriate maternal -  bonding  Description: INTERVENTIONS:  - Assess caregiver- interactions.  - Assess caregiver's emotional status and coping mechanisms.  - Encourage caregiver to participate in  daily care.  - Assess support systems available to mother/family.  - Provide /case management support as needed.  Outcome: Completed

## 2025-05-22 NOTE — PROGRESS NOTES
OB Progress Note PPD#1    S: Feels well. Ambulating, eating. Pain controlled. Moderate VB. Breast and bottle feeding.  Voiding without difficulty, + flatus, no BM    O:  Blood pressure 116/59, pulse 62, temperature 97.7 °F (36.5 °C), temperature source Oral, resp. rate 16, height 5' 7\" (1.702 m), weight 180 lb (81.6 kg), last menstrual period 2024, SpO2 96%, currently breastfeeding.  Gen: NAD, AAOx3  Breasts: soft, nontender, nonerythematous  Abdomen: soft, nontender, nondistended, fundus firm and nontender  Gyne: moderate lochia  Ext: trace edema      Lab Results     Recent Labs   Lab 25  0840 25  0655   RBC 4.12 3.49*   HGB 12.7 11.0*   HCT 38.0 32.1*   MCV 92.2 92.0   MCH 30.8 31.5   MCHC 33.4 34.3   RDW 13.0 13.0   NEPRELIM 5.97 14.41*   WBC 8.1 16.6*   .0 203.0           A/P: PPD#2 s/p , doing well  1. Continue pain control with motrin PRN  2. Breastfeeding   -- given encouragement and support   -- lactation consult PRN  3. Hgb stable at 11.0  4. DVT ppx: ambulating  5. Circumcision declined    Discharge home today, instructions reviewed    Sandra Bartlett D.O.  [facility]  Contact via [employer] or cell

## 2025-05-25 NOTE — PROGRESS NOTES
Cradle call completed. Mom and baby care reviewed. All questions answered. Cradle call letters sent via [employer].

## (undated) NOTE — LETTER
[address]  [address]  Authorization for Surgical Operation and Procedure     Date:___________                                                                                                         Time:__________  I hereby authorize Surgeon(s):  Chica Kumar MD, my physician and his/her assistants (if applicable), which may include medical students, residents, and/or fellows, to perform the following surgical operation/ procedure and administer such anesthesia as may be determined necessary by my physician:  Operation/Procedure name (s) Procedure(s):  DILATION AND CURETTAGE SUCTION on Rhianna Bardales   2.   I recognize that during the surgical operation/procedure, unforeseen conditions may necessitate additional or different procedures than those listed above.  I, therefore, further authorize and request that the above-named surgeon, assistants, or designees perform such procedures as are, in their judgment, necessary and desirable.    3.   My surgeon/physician has discussed prior to my surgery the potential benefits, risks and side effects of this procedure; the likelihood of achieving goals; and potential problems that might occur during recuperation.  They also discussed reasonable alternatives to the procedure, including risks, benefits, and side effects related to the alternatives and risks related to not receiving this procedure.  I have had all my questions answered and I acknowledge that no guarantee has been made as to the result that may be obtained.    4.   Should the need arise during my operation/procedure, which includes change of level of care prior to discharge, I also consent to the administration of blood and/or blood products.  Further, I understand that despite careful testing and screening of blood or blood products by collecting agencies, I may still be subject to ill effects as a result of receiving a blood transfusion and/or blood products.  The  following are some, but not all, of the potential risks that can occur: fever and allergic reactions, hemolytic reactions, transmission of diseases such as Hepatitis, AIDS and Cytomegalovirus (CMV) and fluid overload.  In the event that I wish to have an autologous transfusion of my own blood, or a directed donor transfusion, I will discuss this with my physician.  Check only if Refusing Blood or Blood Products  I understand refusal of blood or blood products as deemed necessary by my physician may have serious consequences to my condition to include possible death. I hereby assume responsibility for my refusal and release the hospital, its personnel, and my physicians from any responsibility for the consequences of my refusal.          o  Refuse      5.   I authorize the use of any specimen, organs, tissues, body parts or foreign objects that may be removed from my body during the operation/procedure for diagnosis, research or teaching purposes and their subsequent disposal by hospital authorities.  I also authorize the release of specimen test results and/or written reports to my treating physician on the hospital medical staff or other referring or consulting physicians involved in my care, at the discretion of the Pathologist or my treating physician.    6.   I consent to the photographing or videotaping of the operations or procedures to be performed, including appropriate portions of my body for medical, scientific, or educational purposes, provided my identity is not revealed by the pictures or by descriptive texts accompanying them.  If the procedure has been photographed/videotaped, the surgeon will obtain the original picture, image, videotape or CD.  The hospital will not be responsible for storage, release or maintenance of the picture, image, tape or CD.    7.   I consent to the presence of a  or observers in the operating room as deemed necessary by my physician or their designees.     8.   I recognize that in the event my procedure results in extended X-Ray/fluoroscopy time, I may develop a skin reaction.    9. If I have a Do Not Attempt Resuscitation (DNAR) order in place, that status will be suspended while in the operating room, procedural suite, and during the recovery period unless otherwise explicitly stated by me (or a person authorized to consent on my behalf). The surgeon or my attending physician will determine when the applicable recovery period ends for purposes of reinstating the DNAR order.  10. Patients having a sterilization procedure: I understand that if the procedure is successful the results will be permanent and it will therefore be impossible for me to inseminate, conceive, or bear children.  I also understand that the procedure is intended to result in sterility, although the result has not been guaranteed.   11. I acknowledge that my physician has explained sedation/analgesia administration to me including the risk and benefits I consent to the administration of sedation/analgesia as may be necessary or desirable in the judgment of my physician.    I CERTIFY THAT I HAVE READ AND FULLY UNDERSTAND THE ABOVE CONSENT TO OPERATION and/or OTHER PROCEDURE.    _________________________________________  __________________________________  Signature of Patient     Signature of Responsible Person         ___________________________________         Printed Name of Responsible Person           _________________________________                 Relationship to Patient  _________________________________________  ______________________________  Signature of Witness          Date  Time      Patient Name: Rhianna Johnson Catia     : 3/14/1994                 Printed: May 17, 2024     Medical Record #: [id number]                     Page 1 of [address] [place] [address]  [place]  [address]    Consent for Anesthesia    I, Rhianna Bardales  agree to be cared for by an anesthesiologist, who is specially trained to monitor me and give me medicine to put me to sleep or keep me comfortable during my procedure    I understand that my anesthesiologist is not an employee or agent of [facility] or [employer]. He or she works for [employer].    As the patient asking for anesthesia services, I agree to:  Allow the anesthesiologist (anesthesia doctor) to give me medicine and do additional procedures as necessary. Some examples are: Starting or using an “IV” to give me medicine, fluids or blood during my procedure, and having a breathing tube placed to help me breathe when I’m asleep (intubation). In the event that my heart stops working properly, I understand that my anesthesiologist will make every effort to sustain my life, unless otherwise directed by [facility] Do Not Resuscitate documents.  Tell my anesthesia doctor before my procedure:  If I am pregnant.  The last time that I ate or drank.  All of the medicines I take (including prescriptions, herbal supplements, and pills I can buy without a prescription (including street drugs/illegal medications). Failure to inform my anesthesiologist about these medicines may increase my risk of anesthetic complications.  If I am allergic to anything or have had a reaction to anesthesia before.  I understand how the anesthesia medicine will help me (benefits).  I understand that with any type of anesthesia medicine there are risks:  The most common risks are: nausea, vomiting, sore throat, muscle soreness, damage to my eyes, mouth, or teeth (from breathing tube placement).  Rare risks include: remembering what happened during my procedure, allergic reactions to medications, injury to my airway, heart, lungs, vision, nerves, or muscles and in extremely rare instances death.  My doctor has explained to me other choices available to me for my care (alternatives).  Pregnant Patients  (“epidural”):  I understand that the risks of having an epidural (medicine given into my back to help control pain during labor), include itching, low blood pressure, difficulty urinating, headache or slowing of the baby’s heart. Very rare risks include infection, bleeding, seizure, irregular heart rhythms and nerve injury.  Regional Anesthesia (“spinal”, “epidural”, & “nerve blocks”):  I understand that rare but potential complications include headache, bleeding, infection, seizure, irregular heart rhythms, and nerve injury.    I can change my mind about having anesthesia services at any time before I get the medicine.    _____________________________________________________________________________  Patient (or Representative) Signature/Relationship to Patient  Date   Time    _____________________________________________________________________________   Name (if used)    Language/Organization   Time    _____________________________________________________________________________  Anesthesiologist Signature     Date   Time  I have discussed the procedure and information above with the patient (or patient’s representative) and answered their questions. The patient or their representative has agreed to have anesthesia services.    _____________________________________________________________________________  Witness        Date   Time  I have verified that the signature is that of the patient or patient’s representative, and that it was signed before the procedure  Patient Name: Rhianna Johnson Catia     : 3/14/1994                 Printed: May 17, 2024     Medical Record #: [id number]                     Page 2 of 2